# Patient Record
Sex: FEMALE | Race: BLACK OR AFRICAN AMERICAN
[De-identification: names, ages, dates, MRNs, and addresses within clinical notes are randomized per-mention and may not be internally consistent; named-entity substitution may affect disease eponyms.]

---

## 2018-07-02 ENCOUNTER — HOSPITAL ENCOUNTER (OUTPATIENT)
Dept: HOSPITAL 35 - CAT | Age: 83
End: 2018-07-02
Attending: INTERNAL MEDICINE
Payer: COMMERCIAL

## 2018-07-02 DIAGNOSIS — I25.84: ICD-10-CM

## 2018-07-02 DIAGNOSIS — M41.84: Primary | ICD-10-CM

## 2018-07-02 DIAGNOSIS — J98.4: ICD-10-CM

## 2018-07-02 DIAGNOSIS — J41.1: ICD-10-CM

## 2018-07-19 ENCOUNTER — HOSPITAL ENCOUNTER (OUTPATIENT)
Dept: HOSPITAL 35 - ULTRA | Age: 83
End: 2018-07-19
Attending: FAMILY MEDICINE
Payer: COMMERCIAL

## 2018-07-19 DIAGNOSIS — Z85.3: ICD-10-CM

## 2018-07-19 DIAGNOSIS — E04.2: Primary | ICD-10-CM

## 2019-02-12 ENCOUNTER — HOSPITAL ENCOUNTER (OUTPATIENT)
Dept: HOSPITAL 35 - PAIN | Age: 84
Discharge: HOME | End: 2019-02-12
Attending: ANESTHESIOLOGY
Payer: COMMERCIAL

## 2019-02-12 VITALS — WEIGHT: 158 LBS | HEIGHT: 65.98 IN | BODY MASS INDEX: 25.39 KG/M2

## 2019-02-12 VITALS — DIASTOLIC BLOOD PRESSURE: 78 MMHG | SYSTOLIC BLOOD PRESSURE: 176 MMHG

## 2019-02-12 VITALS — SYSTOLIC BLOOD PRESSURE: 176 MMHG | DIASTOLIC BLOOD PRESSURE: 78 MMHG

## 2019-02-12 DIAGNOSIS — M54.16: Primary | ICD-10-CM

## 2019-02-12 DIAGNOSIS — Z87.891: ICD-10-CM

## 2019-02-12 DIAGNOSIS — Z88.8: ICD-10-CM

## 2019-02-12 DIAGNOSIS — G89.29: ICD-10-CM

## 2019-02-12 DIAGNOSIS — Z85.3: ICD-10-CM

## 2019-02-12 DIAGNOSIS — Z98.890: ICD-10-CM

## 2019-02-12 DIAGNOSIS — Z91.041: ICD-10-CM

## 2019-02-12 DIAGNOSIS — Z79.899: ICD-10-CM

## 2019-02-12 NOTE — HPC
[facility]
[address]
[place]   [address]                     PAIN MANAGEMENT CONSULTATION  
_______________________________________________________________________________
 
Name:       EUNICE COOPER                 Room #:                     [facility]#:      [id number]                       Account #:      [id number]  
Admission:  19    Attend Phys:    Inocente Jacobs DO  
Discharge:              Date of Birth:  [birth year]  
                                                          Report #: [id number]
                                                                    [id number]   
_______________________________________________________________________________
THIS REPORT FOR:   //name//                          
 
CC: SADAF Jacobs
 
DATE OF SERVICE:  2019
 
 
REFERRING PHYSICIAN:  KRISTOPHER Harmon.
 
CHIEF COMPLAINT:  Low back pain, right lower extremity pain with paresthesias.
 
HISTORY OF PRESENT ILLNESS:  As you know, the patient is an 86-year-old female
who has been referred to our service by her orthopedic team to trial a possible
epidural injection under fluoroscopic guidance to address suspected lumbar
radiculopathy.  The patient has been evaluated by  [facility] for
ongoing issues, it appears that they have ruled out the possibility of her hip
being a problem.  They believed that part of her symptoms may be related to the
findings at the L4-L5 level, and the patient was subsequently referred to our
clinic to trial a lumbar epidural injection to address the broad-based right
paracentral disk protrusion at this level.
 
The patient indicates today pain is periodic, describes the pain as sharp,
aching, intermittent numbness and tingling.  She places current pain score 8/10,
daily average at 8/10, worst pain has been 8/10.  The patient states pain is
exacerbated with activity such as walking, standing and climbing stairs,
improves with lying down.  She has been referred to our service to trial a
lumbar epidural injection under fluoroscopic guidance to address lumbar
radicular symptoms secondary to the findings at the L4-L5 level.
 
PAST MEDICAL HISTORY:
1.  Hypertension.
2.  History of breast cancer, status post surgery.
3.  Osteoarthritis.
 
PAST SURGICAL HISTORY:
1.  Breast surgery for cancer.
2.  Hysterectomy.
3.  Chest surgery.
 
SOCIAL HISTORY:  The patient denies tobacco, IV or illicit drug use.  Admits to
approximately 1 alcoholic beverage per day.  She is retired, retired years ago. 
She is receiving disability income.  She is not in litigation in regards to
pain.  She is accompanied by daughter present in room today.
 
 
 
 
[facility]
[address] [place]   [address]                     PAIN MANAGEMENT CONSULTATION  
_______________________________________________________________________________
 
Name:       EUNICE COOPER LIGIA                 [id number]                       Account #:      [id number]  
Admission:  19    Attend Phys:    Inocente Jacobs DO  
Discharge:              Date of Birth:  [birth year]  
                                                          Report #: [id number]
                                                                    [id number]   
_______________________________________________________________________________
REVIEW OF SYSTEMS:  Positive for weight gain, glaucoma, cataracts, low back pain
and right lower extremity pain and paresthesia, difficulty ambulating with pain.
 All other review of systems negative per 12-point review of systems other than
those listed in history of present illness.
 
Pain impact score 24/70, indicating mild interference of daily activities
secondary to pain.
 
IMAGING:  MRI of the lumbar spine obtained on 2019 shows degenerative
changes and facet degenerative changes throughout the lumbar spine with mild
central canal stenosis L4-L5 level.  There is lateral recess narrowing
bilaterally at that level.  Broad-based right paracentral disk protrusion at
this level as well.  There is high-grade lateral recess stenosis on the right,
moderate to the left.
 
PQRS:  The patient has known osteoarthritic changes in the low back, bilateral
hips and bilateral knees.  No rheumatoid arthritis.  She is placing pain
intensity today at 8/10.  She is a fall risk, but has not had fallen in the last
3 months.  She does use gait assistive devices.  She is not on blood thinners. 
She is treated for hypertension.  She is not on opioid.  She has a low opiate
addiction potential.  Pain impact score 24/70, indicating mild-to-moderate
interference of daily activities secondary to pain.
 
PHYSICAL EXAMINATION:
VITAL SIGNS:  Blood pressure 176/78, pulse 80, respiratory rate 16 and
unlabored.  The patient is 100% on room air.  Height 5 feet 6 inches tall,
weight 158 pounds, BMI calculated 25.5.
GENERAL:  Well-developed, well-nourished, well-hydrated 86-year-old female,
appearing her stated age, placing pain score today at 8/10.
HEENT:  Normocephalic, atraumatic.  Pupils equal, round, reactive to light. 
Extraocular muscles are intact.
NEUROLOGIC:  Speech is fluent.  The patient deemed a good historian.
LUNGS:  Clear.  No wheeze, rhonchi or rales.
CARDIOVASCULAR:  Regular.  No appreciable gallop, no rub.
ABDOMEN:  Soft and nontender.
EXTREMITIES:  Show no clubbing, no cyanosis, no edema.
MUSCULOSKELETAL:  Lower extremity strength is symmetrical, but deconditioned
bilaterally.  Strength rated at 4+/5.  Muscle bulk and tone is symmetrical in
comparing right lower extremity to left.  Seated straight leg raising negative. 
Supine straight leg raising mildly positive right.  Indy test is negative. 
Gait is antalgic favoring right lower extremity over left.  Modified Gaenslen
positive for some axial low back pain, no radiation of symptoms.  Lumbar
provocation testing met with increasing axial back pain due to facet changes, no
radicular component.  Ankle clonus negative.  Babinski is negative.
 
ASSESSMENT:
 
 
 
[facility]
[address] [place]   [address]                     PAIN MANAGEMENT CONSULTATION  
_______________________________________________________________________________
 
Name:       EUNICE COOPER                 Room #:                     REG [facility] 
JC#:      [id number]                       Account #:      [id number]  
Admission:  19    Attend Phys:    Inocente Jacobs DO  
Discharge:              Date of Birth:  [birth year]  
                                                          Report #: [id number]
                                                                    [id number]   
_______________________________________________________________________________
1.  Symptomatic lumbar radiculopathy.
2.  Displacement of a lumbar intervertebral disk with radiculopathy.
3.  Lumbosacral spondylosis with radiculopathy.
4.  Degeneration of the lumbar spine. 
5.  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
6.  Chronic intractable pain.
 
PLAN:
1.  Based on today's physical exam and history the patient provided, the
description the patient uses in regards to pain as well as the location of
symptoms, the likely source of the patient's pain is lumbar radiculopathy.  It
does appear the patient has changes at the L4-L5 level consistent with her
current distribution of symptoms.  She has been referred to our clinic by her
orthopedic surgery team to trial an epidural injection and determine if symptoms
could improve with injection therapy.  We have discussed this with the patient
today.  We also discussed other treatment options, which would include the
followin.  We discussed physical therapy, stretching exercise, core strengthening as a
treatment option.  We discussed medication management with neuropathic pain
medication and consistent nonsteroidal anti-inflammatory, assuming she can
tolerate these medications.  We discussed the epidural injection, for which the
patient was referred to our clinic and surgical options.  After reviewing the
risks and benefits of all the proposed treatment options, the patient chose to
move forward with a lumbar epidural injection.
 
3.   We have advised the patient of the risks and benefits of a lumbar epidural
injection.  These risks include but are not necessarily limited to bleeding,
bruising, infection, worsening pain, no relief of pain, also risk of temporary
or permanent muscle weakness, temporary or permanent nerve damage, possible
paralysis and death.  The patient states understood and wished to proceed.
 
4.  No medication changes made at today's visit.  The patient to continue
current medical therapy as previously prescribed.
 
5.  We will see the patient back in followup visit in approximately one month. 
At that time, review the efficacy of today's lumbar epidural injection to
determine if next in the series of epidural injections might be recommended.
 
6.  We wish to thank the referring team for the opportunity to see this patient
in consultation.  We will keep you apprised of response to treatment as we
address lumbar radicular symptoms.  Again, we wish to thank you for the
opportunity to see the patient in consultation.
 
PROCEDURE NOTE
 
 
 
 
[address]   [address]                     PAIN MANAGEMENT CONSULTATION  
_______________________________________________________________________________
 
Name:       EUNICE COOPER                 Room #:                     REG [facility]#:      [id number]                       Account #:      [id number]  
Admission:  19    Attend Phys:    Inocente Jacobs DO  
Discharge:              Date of Birth:  [birth year]  
                                                          Report #: [id number]
                                                                    [id number]   
_______________________________________________________________________________
DESCRIPTION OF PROCEDURE:  L5-S1 paramedian epidural steroid injection under
fluoroscopic guidance.
 
This is the first procedure of the first series that the patient is undergoing.
 
After obtaining written consent, the patient was taken back to the fluoroscopy
suite, placed in a prone position with pillow under the abdomen to decrease
lumbar lordosis.  The skin overlying the lumbosacral area was then prepped and
draped in aseptic fashion.  The L5-S1 vertebral interspace was then identified
by AP fluoroscopy.  The skin and subcutaneous tissue overlying the target site
of injection was anesthetized with 3 mL 1% lidocaine.
 
A 20-gauge 3-1/2 inch Tuohy needle was then advanced under fluoroscopic guidance
towards the epidural space using a paramedian approach.  The epidural space was
identified using loss of resistance to air technique.  After negative aspiration
for heme or cerebrospinal fluid, a total of 1mL of Omnipaque was injected.  A
lumbar epidurogram was confirmed using both AP and lateral  fluoroscopy.  After
negative aspiration for heme or cerebrospinal fluid, 5 mL of a solution
containing 2 mL 40 mg per mL, 80 mg total triamcinolone, 3 mL lidocaine 1% was
injected in increments.  Contrast spread was noted posterior epidural space. 
The needle was then retracted approximately half way and needle tract flushed
with 1 mL of 1% lidocaine.  Needle was then removed.  There were no apparent
sensory or motor deficits in the lower extremity following the procedure.  A
sterile bandage was placed over the injection site.
 
The heart rate, pulse, oximetry and blood pressure were continuously monitored
after the procedure.  There were no apparent complications.  The patient
tolerated the procedure well and was carefully escorted to the recovery room in
stable condition.  There were no apparent complications.  After meeting
discharge criteria, the patient was then discharged home.
 
 
 
 
 
 
 
 
 
 
 
 
 
 
                         
   By:                               
                   
D: 19 0844                           _____________________________________
T: 19 0943                           Inocente Jacobs DO            /nt

## 2019-02-12 NOTE — NUR
Pain Clinic Assessment:
 
1. History of Osteoarthritis:
hips
   History of Rheumatoid Arthritis:
n
 
2. Height: 5 ft. 6 in. 167.6 cm.
   Weight: 158.0 lb.  oz. 71.668 kg.
   Patient's BMI: 25.5
 
3. Vital Signs:
   BP: 176/78 Pulse: 80 Resp: 16
   Temp:  02 Sat: 100 ECG Mon:
 
4. Pain Intensity: 8
 
5. Fall Risk:
   Dizziness: N  Needs help standing or walking: Y
   Fallen in the last 3 months: N
   Fall risk comments:
 
 
6. Patient on Blood Thinner: None
 
7. History of Hypertension: Y
 
8. Opioid Therapy greater than 6 weeks: N
   Opiate Contract Signed:
 
9. Risk Assessment Tool Provided: low-0
 
10. Functional Assessment Tool: 24/70
 
11. Recreational Drug Use: Never Drug Type:
    Tobacco Use: Former Smoker Tobacco Type:
       Amount or Packs/day:  How Many Years:
    Alcohol Use: Yes  Frequency: Weekly Quant: 1